# Patient Record
Sex: FEMALE | Race: OTHER | HISPANIC OR LATINO
[De-identification: names, ages, dates, MRNs, and addresses within clinical notes are randomized per-mention and may not be internally consistent; named-entity substitution may affect disease eponyms.]

---

## 2022-02-04 PROBLEM — Z00.00 ENCOUNTER FOR PREVENTIVE HEALTH EXAMINATION: Status: ACTIVE | Noted: 2022-02-04

## 2022-02-10 ENCOUNTER — ASOB RESULT (OUTPATIENT)
Age: 32
End: 2022-02-10

## 2022-02-10 ENCOUNTER — APPOINTMENT (OUTPATIENT)
Dept: ANTEPARTUM | Facility: CLINIC | Age: 32
End: 2022-02-10
Payer: MEDICAID

## 2022-02-10 PROCEDURE — 99072 ADDL SUPL MATRL&STAF TM PHE: CPT

## 2022-02-10 PROCEDURE — 76805 OB US >/= 14 WKS SNGL FETUS: CPT

## 2022-03-02 ENCOUNTER — ASOB RESULT (OUTPATIENT)
Age: 32
End: 2022-03-02

## 2022-03-02 ENCOUNTER — APPOINTMENT (OUTPATIENT)
Dept: ANTEPARTUM | Facility: CLINIC | Age: 32
End: 2022-03-02
Payer: MEDICAID

## 2022-03-02 PROCEDURE — 76816 OB US FOLLOW-UP PER FETUS: CPT

## 2022-05-04 ENCOUNTER — APPOINTMENT (OUTPATIENT)
Dept: ANTEPARTUM | Facility: CLINIC | Age: 32
End: 2022-05-04
Payer: MEDICAID

## 2022-05-04 ENCOUNTER — ASOB RESULT (OUTPATIENT)
Age: 32
End: 2022-05-04

## 2022-05-04 PROCEDURE — 76816 OB US FOLLOW-UP PER FETUS: CPT

## 2022-05-04 PROCEDURE — 76819 FETAL BIOPHYS PROFIL W/O NST: CPT

## 2022-05-04 PROCEDURE — 76820 UMBILICAL ARTERY ECHO: CPT

## 2022-05-11 ENCOUNTER — APPOINTMENT (OUTPATIENT)
Dept: ANTEPARTUM | Facility: CLINIC | Age: 32
End: 2022-05-11
Payer: MEDICAID

## 2022-05-11 ENCOUNTER — ASOB RESULT (OUTPATIENT)
Age: 32
End: 2022-05-11

## 2022-05-11 PROCEDURE — 93976 VASCULAR STUDY: CPT

## 2022-05-11 PROCEDURE — 76818 FETAL BIOPHYS PROFILE W/NST: CPT

## 2022-05-11 PROCEDURE — ZZZZZ: CPT

## 2022-05-11 PROCEDURE — 76821 MIDDLE CEREBRAL ARTERY ECHO: CPT

## 2022-05-11 PROCEDURE — 76820 UMBILICAL ARTERY ECHO: CPT

## 2022-05-18 ENCOUNTER — APPOINTMENT (OUTPATIENT)
Dept: ANTEPARTUM | Facility: CLINIC | Age: 32
End: 2022-05-18

## 2022-05-25 ENCOUNTER — ASOB RESULT (OUTPATIENT)
Age: 32
End: 2022-05-25

## 2022-05-25 ENCOUNTER — APPOINTMENT (OUTPATIENT)
Dept: ANTEPARTUM | Facility: CLINIC | Age: 32
End: 2022-05-25
Payer: MEDICAID

## 2022-05-25 PROCEDURE — 76816 OB US FOLLOW-UP PER FETUS: CPT

## 2022-05-25 PROCEDURE — 76819 FETAL BIOPHYS PROFIL W/O NST: CPT

## 2022-06-01 ENCOUNTER — APPOINTMENT (OUTPATIENT)
Dept: ANTEPARTUM | Facility: CLINIC | Age: 32
End: 2022-06-01

## 2022-06-08 ENCOUNTER — APPOINTMENT (OUTPATIENT)
Dept: ANTEPARTUM | Facility: CLINIC | Age: 32
End: 2022-06-08

## 2022-06-15 ENCOUNTER — APPOINTMENT (OUTPATIENT)
Dept: MATERNAL FETAL MEDICINE | Facility: CLINIC | Age: 32
End: 2022-06-15
Payer: MEDICAID

## 2022-06-15 ENCOUNTER — APPOINTMENT (OUTPATIENT)
Dept: ANTEPARTUM | Facility: CLINIC | Age: 32
End: 2022-06-15

## 2022-06-15 ENCOUNTER — APPOINTMENT (OUTPATIENT)
Dept: ANTEPARTUM | Facility: CLINIC | Age: 32
End: 2022-06-15
Payer: MEDICAID

## 2022-06-15 ENCOUNTER — ASOB RESULT (OUTPATIENT)
Age: 32
End: 2022-06-15

## 2022-06-15 ENCOUNTER — TRANSCRIPTION ENCOUNTER (OUTPATIENT)
Age: 32
End: 2022-06-15

## 2022-06-15 VITALS
HEIGHT: 62 IN | BODY MASS INDEX: 31.83 KG/M2 | RESPIRATION RATE: 16 BRPM | SYSTOLIC BLOOD PRESSURE: 116 MMHG | HEART RATE: 66 BPM | DIASTOLIC BLOOD PRESSURE: 76 MMHG | WEIGHT: 173 LBS | OXYGEN SATURATION: 98 %

## 2022-06-15 DIAGNOSIS — O36.5930 MATERNAL CARE FOR OTHER KNOWN OR SUSPECTED POOR FETAL GROWTH, THIRD TRIMESTER, NOT APPLICABLE OR UNSPECIFIED: ICD-10-CM

## 2022-06-15 DIAGNOSIS — Z78.9 OTHER SPECIFIED HEALTH STATUS: ICD-10-CM

## 2022-06-15 DIAGNOSIS — O99.213 OBESITY COMPLICATING PREGNANCY, THIRD TRIMESTER: ICD-10-CM

## 2022-06-15 DIAGNOSIS — Z3A.37 37 WEEKS GESTATION OF PREGNANCY: ICD-10-CM

## 2022-06-15 PROCEDURE — 93976 VASCULAR STUDY: CPT

## 2022-06-15 PROCEDURE — 76816 OB US FOLLOW-UP PER FETUS: CPT

## 2022-06-15 PROCEDURE — 76819 FETAL BIOPHYS PROFIL W/O NST: CPT

## 2022-06-15 PROCEDURE — 99205 OFFICE O/P NEW HI 60 MIN: CPT | Mod: TH,GC

## 2022-06-15 PROCEDURE — 76820 UMBILICAL ARTERY ECHO: CPT

## 2022-06-15 RX ORDER — PRENATAL VIT NO.126/IRON/FOLIC 28MG-0.8MG
28-0.8 TABLET ORAL
Refills: 0 | Status: ACTIVE | COMMUNITY

## 2022-06-15 NOTE — VITALS
[US Date: ___] : ultrasound performed on [unfilled]. [GA= ___ Weeks] : Results were GA of [unfilled] weeks [GA= ___ Days] : and [unfilled] day(s) [VIOLET by US (date): ___] : The calculated VIOLET by US is [unfilled] [By US] : this is the final VIOLET

## 2022-06-15 NOTE — ACTIVE PROBLEMS
[Diabetes Mellitus] : no diabetes mellitus [Heart Disease] : no heart disease [Hypertension] : no hypertension [Autoimmune Disease] : no autoimmune disease [Renal Disease] : no kidney disease, no UTI [Neurologic Disorder] : no neurologic disorder, no epilepsy [Psychiatric Disorders] : no psychiatric disorders [Depression] : no depression, no post partum depression [Hepatic Disorder] : no hepatitis, no liver disease [Thrombophlebitis] : no varicosities, no phlebitis [Thyroid Disorder] : no thyroid dysfunction [Trauma] : no trauma/violence [Blood Transfusion (___ Ml)] : no history of blood transfusion

## 2022-06-15 NOTE — FAMILY HISTORY
[Age 35+ During Pregnancy] : not 35 or over during pregnancy [Reported Family History Of Birth Defects] : no congenital heart defects [Dominik-Sachs Carrier] : no Dominik-Sachs [Family History] : no mental retardation/autism [Reported Family History Of Genetic Disease] : no history of child defect in child of baby father

## 2022-06-16 ENCOUNTER — TRANSCRIPTION ENCOUNTER (OUTPATIENT)
Age: 32
End: 2022-06-16

## 2022-06-16 ENCOUNTER — INPATIENT (INPATIENT)
Facility: HOSPITAL | Age: 32
LOS: 1 days | Discharge: HOME | End: 2022-06-18
Attending: OBSTETRICS & GYNECOLOGY | Admitting: OBSTETRICS & GYNECOLOGY
Payer: COMMERCIAL

## 2022-06-16 ENCOUNTER — RESULT REVIEW (OUTPATIENT)
Age: 32
End: 2022-06-16

## 2022-06-16 VITALS — DIASTOLIC BLOOD PRESSURE: 75 MMHG | SYSTOLIC BLOOD PRESSURE: 123 MMHG | HEART RATE: 80 BPM

## 2022-06-16 DIAGNOSIS — O36.5930 MATERNAL CARE FOR OTHER KNOWN OR SUSPECTED POOR FETAL GROWTH, THIRD TRIMESTER, NOT APPLICABLE OR UNSPECIFIED: ICD-10-CM

## 2022-06-16 DIAGNOSIS — O36.5990 MATERNAL CARE FOR OTHER KNOWN OR SUSPECTED POOR FETAL GROWTH, UNSPECIFIED TRIMESTER, NOT APPLICABLE OR UNSPECIFIED: ICD-10-CM

## 2022-06-16 DIAGNOSIS — O26.893 OTHER SPECIFIED PREGNANCY RELATED CONDITIONS, THIRD TRIMESTER: ICD-10-CM

## 2022-06-16 LAB
BASOPHILS # BLD AUTO: 0.07 K/UL — SIGNIFICANT CHANGE UP (ref 0–0.2)
BASOPHILS NFR BLD AUTO: 0.6 % — SIGNIFICANT CHANGE UP (ref 0–2)
BLD GP AB SCN SERPL QL: SIGNIFICANT CHANGE UP
EOSINOPHIL # BLD AUTO: 0.13 K/UL — SIGNIFICANT CHANGE UP (ref 0–0.5)
EOSINOPHIL NFR BLD AUTO: 1.2 % — SIGNIFICANT CHANGE UP (ref 0–6)
HCT VFR BLD CALC: 37.1 % — SIGNIFICANT CHANGE UP (ref 34.5–45)
HGB BLD-MCNC: 12.1 G/DL — SIGNIFICANT CHANGE UP (ref 11.5–15.5)
IMM GRANULOCYTES NFR BLD AUTO: 0.9 % — SIGNIFICANT CHANGE UP (ref 0–1.5)
LYMPHOCYTES # BLD AUTO: 1.41 K/UL — SIGNIFICANT CHANGE UP (ref 1–3.3)
LYMPHOCYTES # BLD AUTO: 13.1 % — SIGNIFICANT CHANGE UP (ref 13–44)
MCHC RBC-ENTMCNC: 27.3 PG — SIGNIFICANT CHANGE UP (ref 27–34)
MCHC RBC-ENTMCNC: 32.6 GM/DL — SIGNIFICANT CHANGE UP (ref 32–36)
MCV RBC AUTO: 83.6 FL — SIGNIFICANT CHANGE UP (ref 80–100)
MONOCYTES # BLD AUTO: 0.77 K/UL — SIGNIFICANT CHANGE UP (ref 0–0.9)
MONOCYTES NFR BLD AUTO: 7.1 % — SIGNIFICANT CHANGE UP (ref 2–14)
NEUTROPHILS # BLD AUTO: 8.3 K/UL — HIGH (ref 1.8–7.4)
NEUTROPHILS NFR BLD AUTO: 77.1 % — HIGH (ref 43–77)
PLATELET # BLD AUTO: 285 K/UL — SIGNIFICANT CHANGE UP (ref 150–400)
RBC # BLD: 4.44 M/UL — SIGNIFICANT CHANGE UP (ref 3.8–5.2)
RBC # FLD: 13.5 % — SIGNIFICANT CHANGE UP (ref 10.3–14.5)
SARS-COV-2 RNA SPEC QL NAA+PROBE: SIGNIFICANT CHANGE UP
WBC # BLD: 10.78 K/UL — HIGH (ref 3.8–10.5)
WBC # FLD AUTO: 10.78 K/UL — HIGH (ref 3.8–10.5)

## 2022-06-16 PROCEDURE — 88307 TISSUE EXAM BY PATHOLOGIST: CPT | Mod: 26

## 2022-06-16 PROCEDURE — 59514 CESAREAN DELIVERY ONLY: CPT | Mod: U9,GC

## 2022-06-16 PROCEDURE — 59412 ANTEPARTUM MANIPULATION: CPT | Mod: GC

## 2022-06-16 RX ORDER — ENOXAPARIN SODIUM 100 MG/ML
40 INJECTION SUBCUTANEOUS EVERY 24 HOURS
Refills: 0 | Status: DISCONTINUED | OUTPATIENT
Start: 2022-06-17 | End: 2022-06-18

## 2022-06-16 RX ORDER — DIPHENHYDRAMINE HCL 50 MG
25 CAPSULE ORAL EVERY 4 HOURS
Refills: 0 | Status: DISCONTINUED | OUTPATIENT
Start: 2022-06-16 | End: 2022-06-18

## 2022-06-16 RX ORDER — KETOROLAC TROMETHAMINE 30 MG/ML
30 SYRINGE (ML) INJECTION EVERY 6 HOURS
Refills: 0 | Status: DISCONTINUED | OUTPATIENT
Start: 2022-06-16 | End: 2022-06-17

## 2022-06-16 RX ORDER — ONDANSETRON 8 MG/1
4 TABLET, FILM COATED ORAL EVERY 6 HOURS
Refills: 0 | Status: DISCONTINUED | OUTPATIENT
Start: 2022-06-16 | End: 2022-06-18

## 2022-06-16 RX ORDER — OXYCODONE HYDROCHLORIDE 5 MG/1
5 TABLET ORAL ONCE
Refills: 0 | Status: DISCONTINUED | OUTPATIENT
Start: 2022-06-16 | End: 2022-06-18

## 2022-06-16 RX ORDER — OXYTOCIN 10 UNIT/ML
333.33 VIAL (ML) INJECTION
Qty: 20 | Refills: 0 | Status: DISCONTINUED | OUTPATIENT
Start: 2022-06-16 | End: 2022-06-18

## 2022-06-16 RX ORDER — ACETAMINOPHEN 500 MG
975 TABLET ORAL
Refills: 0 | Status: DISCONTINUED | OUTPATIENT
Start: 2022-06-16 | End: 2022-06-18

## 2022-06-16 RX ORDER — SODIUM CHLORIDE 9 MG/ML
1000 INJECTION, SOLUTION INTRAVENOUS ONCE
Refills: 0 | Status: COMPLETED | OUTPATIENT
Start: 2022-06-16 | End: 2022-06-16

## 2022-06-16 RX ORDER — NALBUPHINE HYDROCHLORIDE 10 MG/ML
2.5 INJECTION, SOLUTION INTRAMUSCULAR; INTRAVENOUS; SUBCUTANEOUS EVERY 6 HOURS
Refills: 0 | Status: DISCONTINUED | OUTPATIENT
Start: 2022-06-16 | End: 2022-06-18

## 2022-06-16 RX ORDER — DIPHENHYDRAMINE HCL 50 MG
25 CAPSULE ORAL EVERY 6 HOURS
Refills: 0 | Status: DISCONTINUED | OUTPATIENT
Start: 2022-06-16 | End: 2022-06-18

## 2022-06-16 RX ORDER — ACETAMINOPHEN 500 MG
1000 TABLET ORAL ONCE
Refills: 0 | Status: DISCONTINUED | OUTPATIENT
Start: 2022-06-16 | End: 2022-06-18

## 2022-06-16 RX ORDER — FAMOTIDINE 10 MG/ML
20 INJECTION INTRAVENOUS ONCE
Refills: 0 | Status: COMPLETED | OUTPATIENT
Start: 2022-06-16 | End: 2022-06-16

## 2022-06-16 RX ORDER — SODIUM CHLORIDE 9 MG/ML
1000 INJECTION, SOLUTION INTRAVENOUS
Refills: 0 | Status: DISCONTINUED | OUTPATIENT
Start: 2022-06-16 | End: 2022-06-16

## 2022-06-16 RX ORDER — SIMETHICONE 80 MG/1
80 TABLET, CHEWABLE ORAL EVERY 4 HOURS
Refills: 0 | Status: DISCONTINUED | OUTPATIENT
Start: 2022-06-16 | End: 2022-06-18

## 2022-06-16 RX ORDER — OXYCODONE HYDROCHLORIDE 5 MG/1
5 TABLET ORAL
Refills: 0 | Status: DISCONTINUED | OUTPATIENT
Start: 2022-06-16 | End: 2022-06-18

## 2022-06-16 RX ORDER — LANOLIN
1 OINTMENT (GRAM) TOPICAL EVERY 6 HOURS
Refills: 0 | Status: DISCONTINUED | OUTPATIENT
Start: 2022-06-16 | End: 2022-06-18

## 2022-06-16 RX ORDER — KETOROLAC TROMETHAMINE 30 MG/ML
30 SYRINGE (ML) INJECTION EVERY 6 HOURS
Refills: 0 | Status: DISCONTINUED | OUTPATIENT
Start: 2022-06-16 | End: 2022-06-18

## 2022-06-16 RX ORDER — INFLUENZA VIRUS VACCINE 15; 15; 15; 15 UG/.5ML; UG/.5ML; UG/.5ML; UG/.5ML
0.5 SUSPENSION INTRAMUSCULAR ONCE
Refills: 0 | Status: DISCONTINUED | OUTPATIENT
Start: 2022-06-16 | End: 2022-06-16

## 2022-06-16 RX ORDER — TETANUS TOXOID, REDUCED DIPHTHERIA TOXOID AND ACELLULAR PERTUSSIS VACCINE, ADSORBED 5; 2.5; 8; 8; 2.5 [IU]/.5ML; [IU]/.5ML; UG/.5ML; UG/.5ML; UG/.5ML
0.5 SUSPENSION INTRAMUSCULAR ONCE
Refills: 0 | Status: DISCONTINUED | OUTPATIENT
Start: 2022-06-16 | End: 2022-06-18

## 2022-06-16 RX ORDER — SODIUM CHLORIDE 9 MG/ML
1000 INJECTION, SOLUTION INTRAVENOUS
Refills: 0 | Status: DISCONTINUED | OUTPATIENT
Start: 2022-06-16 | End: 2022-06-18

## 2022-06-16 RX ORDER — MAGNESIUM HYDROXIDE 400 MG/1
30 TABLET, CHEWABLE ORAL
Refills: 0 | Status: DISCONTINUED | OUTPATIENT
Start: 2022-06-16 | End: 2022-06-18

## 2022-06-16 RX ORDER — CEFAZOLIN SODIUM 1 G
2000 VIAL (EA) INJECTION ONCE
Refills: 0 | Status: COMPLETED | OUTPATIENT
Start: 2022-06-16 | End: 2022-06-16

## 2022-06-16 RX ORDER — CITRIC ACID/SODIUM CITRATE 300-500 MG
30 SOLUTION, ORAL ORAL ONCE
Refills: 0 | Status: COMPLETED | OUTPATIENT
Start: 2022-06-16 | End: 2022-06-16

## 2022-06-16 RX ORDER — IBUPROFEN 200 MG
600 TABLET ORAL EVERY 6 HOURS
Refills: 0 | Status: COMPLETED | OUTPATIENT
Start: 2022-06-16 | End: 2023-05-15

## 2022-06-16 RX ORDER — NALOXONE HYDROCHLORIDE 4 MG/.1ML
0.1 SPRAY NASAL
Refills: 0 | Status: DISCONTINUED | OUTPATIENT
Start: 2022-06-16 | End: 2022-06-18

## 2022-06-16 RX ADMIN — FAMOTIDINE 20 MILLIGRAM(S): 10 INJECTION INTRAVENOUS at 12:04

## 2022-06-16 RX ADMIN — SODIUM CHLORIDE 125 MILLILITER(S): 9 INJECTION, SOLUTION INTRAVENOUS at 10:15

## 2022-06-16 RX ADMIN — Medication 30 MILLIGRAM(S): at 20:49

## 2022-06-16 RX ADMIN — Medication 100 MILLIGRAM(S): at 17:57

## 2022-06-16 RX ADMIN — Medication 30 MILLIGRAM(S): at 20:19

## 2022-06-16 RX ADMIN — Medication 30 MILLILITER(S): at 12:04

## 2022-06-16 RX ADMIN — SODIUM CHLORIDE 1000 MILLILITER(S): 9 INJECTION, SOLUTION INTRAVENOUS at 12:04

## 2022-06-16 RX ADMIN — Medication 0.25 MILLIGRAM(S): at 11:11

## 2022-06-16 RX ADMIN — SODIUM CHLORIDE 125 MILLILITER(S): 9 INJECTION, SOLUTION INTRAVENOUS at 14:36

## 2022-06-16 NOTE — OB RN PATIENT PROFILE - FALL HARM RISK - UNIVERSAL INTERVENTIONS
Bed in lowest position, wheels locked, appropriate side rails in place/Call bell, personal items and telephone in reach/Instruct patient to call for assistance before getting out of bed or chair/Non-slip footwear when patient is out of bed/[place]olly to call system/Physically safe environment - no spills, clutter or unnecessary equipment/Purposeful Proactive Rounding/Room/bathroom lighting operational, light cord in reach

## 2022-06-16 NOTE — OB PROVIDER H&P - ASSESSMENT
32 yr old  at 38w5d with 2 previous VD, breech presentation, FGR 6th percentile for an external cephalic version

## 2022-06-16 NOTE — OB RN DELIVERY SUMMARY - NSSELHIDDEN_OBGYN_ALL_OB_FT
[NS_DeliveryAttending1_OBGYN_ALL_OB_FT:[id number]=],[NS_DeliveryRN_OBGYN_ALL_OB_FT:[id number]=] [NS_DeliveryAttending1_OBGYN_ALL_OB_FT:[id number]=],[NS_DeliveryRN_OBGYN_ALL_OB_FT:[id number]=],[NS_DeliveryAssist1_OBGYN_ALL_OB_FT:[id number]=]

## 2022-06-16 NOTE — OB PROVIDER H&P - ATTENDING COMMENTS
MFM - 32 y.o.  at 38w5d with 2 previous vaginal deliveries, breech presentation, FGR 6th percentile for an external cephalic version  The patient presents to [facility] for external cephalic version (ECV) secondary to fetal malpresentation.  There is a viable brown in breech presentation.  BPP is 8/8 with increased amniotic fluid volume.             We reviewed the option of external cephalic version for breech presentation. We discussed the benefits (vaginal delivery) and risks including abruption, rupture of membranes, cord prolapse, and non-reassuring fetal heart rate patterns leading to emergency  section. Serious adverse maternal and fetal outcomes are reported but rare (<1%).  We also reviewed the procedure in detail and its success rate (50% to 60%), which is dependent on individual variables including nulliparity, placental location, low normal amniotic fluid, mike breech presentation, and persistent breech presentation. The patient was given the opportunity to ask questions and time to discuss her decision with her partner.  After counseling, the patient opted to proceed with ECV.  Informed consent was obtained and the informed consent document was signed. MFM - 32 y.o.  at 38w5d with 2 previous vaginal deliveries, breech presentation, FGR 6th percentile who presents for external cephalic version.  Ultrasound performed at bedside confirms a viable brown in breech presentation.  BPP is 8/8 with low normal amniotic fluid volume.   There was no nuchal cord or nuchal arms identified.  Patient had [facility] consultation prior to admission.  We again reviewed the option of external cephalic version for breech presentation. We discussed the benefits (vaginal delivery) and risks including abruption, rupture of membranes, cord prolapse, and non-reassuring fetal heart rate patterns leading to emergency  section. Serious adverse maternal and fetal outcomes are reported but rare (<1%).  We also reviewed the procedure in detail and its success rate (50% to 60%), which is dependent on individual variables including nulliparity, placental location, low normal amniotic fluid, mike breech presentation, and persistent breech presentation. The patient was given the opportunity to ask questions and time to discuss her family.  After counseling, the patient opted to proceed with ECV.  Informed consent was obtained and the informed consent document was signed.  Plan for EVC and if successful, patient to stay for IOL given FGR.  If not successful, plan for primary CD.  L&D staff and anesthesia aware.   Cherry Christopher MD  Maternal Fetal Medicine

## 2022-06-16 NOTE — OB PROVIDER H&P - HISTORY OF PRESENT ILLNESS
Ms. Braun is a 32 yr old  at 38w5d with breech presents for an external cephalic version. Her pregnancy is also complicated by FGR 6th percentile, normal umbilical artery Doppler. She has no obstertrical complaints. She denies any abdominal pain, leakage of fluid, or vaginal bleeding. She reports good fetal movement.

## 2022-06-16 NOTE — OB PROVIDER H&P - PROBLEM SELECTOR PLAN 3
FHRT reassuring  Plan for delivery today given gestational age with route of delivery dependent on if version is successful

## 2022-06-16 NOTE — OB PROVIDER H&P - PROBLEM SELECTOR PLAN 1
Admitted for ECV: R/B/A discussed with patient including byut not limited to emergency CD, NRFHT, fetal intolerance of procedure, placental abruption, and ROM  Terbutaline  Continuous monitoring

## 2022-06-16 NOTE — OB PROVIDER DELIVERY SUMMARY - NSSELHIDDEN_OBGYN_ALL_OB_FT
[NS_DeliveryAttending1_OBGYN_ALL_OB_FT:[id number]=],[NS_DeliveryRN_OBGYN_ALL_OB_FT:[id number]=],[NS_DeliveryAssist1_OBGYN_ALL_OB_FT:[id number]=]

## 2022-06-16 NOTE — OB PROVIDER H&P - NS_FHRACCEL_OBGYN_ALL_OB
Medication:   Requested Prescriptions     Pending Prescriptions Disp Refills    FLUoxetine (PROZAC) 40 MG capsule [Pharmacy Med Name: FLUOXETINE HCL 40MG CAPS] 30 capsule 5     Sig: TAKE 1 CAPSULE BY MOUTH ONE TIME A DAY        Last Filled:  12/17/19 #30, 11 RF     Patient Phone Number: [phone] (home)     Last appt: 11/10/2020 ADHD   Next appt: Visit date not found Present (15 x15 bpm)

## 2022-06-16 NOTE — PROCEDURE NOTE - NSICDXPROBLEMMLMBOX_GEN
IPSTART~^~1~^~[id number]~^~~^~IPEND  PKSTART~^~[id number]~^~PKEND  IPSTART~^~2~^~[id number]~^~~^~IPEND  PKSTART~^~[id number]~^~PKEND

## 2022-06-16 NOTE — PROCEDURE NOTE - ADDITIONAL PROCEDURE DETAILS
An external cephalic version performed under ultrasound guidance. The fetal heartbeat was checked through out the procedure. The procedure was unsuccessful; unable to vert the fetus. The patient tolerated the procedure well. An external cephalic version performed under ultrasound guidance. The fetal heartbeat was checked through out the procedure. The procedure was unsuccessful; unable to vert the fetus. The patient tolerated the procedure well.  FHR remained reassuring throughout the procedure.      MFM attending addendum:  After a thorough explanation of the risks and benefits, an external cephalic version (ECV) was attempted under continuous ultrasound guidance.  Terbutaline was given prior to procedure. The fetal buttocks were elevated from the pelvis and gentle attempt at ECV was performed.  The ECV was not successful.  Fetal heart rate was documented post procedure and continuous fetal heart rate monitoring was performed  pending  delivery which will be performed today secondary to FGR.  The patient is Rh positive and Rhogam was not indicated.  OB staff and anesthesia aware.   Cherry Christopher MD  Maternal Fetal Medicine

## 2022-06-16 NOTE — OB RN DELIVERY SUMMARY - NS_SEPSISRSKCALC_OBGYN_ALL_OB_FT
EOS calculated successfully. EOS Risk Factor: 0.5/1000 live births ([facility] national incidence); GA=38w6d; Temp=98.8; ROM=0; GBS='Negative'; Antibiotics='No antibiotics or any antibiotics < 2 hrs prior to birth'

## 2022-06-16 NOTE — CHART NOTE - NSCHARTNOTEFT_GEN_A_CORE
PROCEDURE NOTE     Patient was extensively counseled on the R/B/A to ECV. Consent was signed. Patient with reactive NST for >20 minutes. Bedside sonogram confirmed breech presentation. Patient placed in supine position. Terbutaline given. Attempted ECV performed.  Bedside ultrasound used to confirm normal FH multiple times throughout procedure. Failed ECV. Reactive NST after attempt. Decision made to move forward with pCS in the setting of breech presentation and FGR.     Dr. Christopher and Dr. Arguelles present. PROCEDURE NOTE     Patient was extensively counseled on the R/B/A to ECV. Consent was signed. Patient with reactive NST for >20 minutes. Bedside sonogram confirmed breech presentation. Patient placed in supine position. Terbutaline given. ECV performed.  Bedside ultrasound used to confirm normal FH multiple times throughout procedure.  ECV was not successful. Reactive NST after attempt.  Patient scheduled for primary CD today secondary to gestational age, breech presentation, and FGR.     Dr. Christopher and Dr. Arguelles present.

## 2022-06-16 NOTE — OB PROVIDER DELIVERY SUMMARY - NSPROVIDERDELIVERYNOTE_OBGYN_ALL_OB_FT
Brief  Delivery Summary    Procedure: pLTCS for breech presentation, FGR, failed ECV   Findings: Viable male infant, apgars 7/8, weight 2610g, breech presentation. Grossly normal uterus, fallopian tubes and ovaries.   Single layer uterine closure  SubQ skin closure  EBL: 416cc  UOP: 100cc  Fluids in OR: 1500cc  Complications: Uterine inversion

## 2022-06-16 NOTE — OB RN INTRAOPERATIVE NOTE - NSSELHIDDEN_OBGYN_ALL_OB_FT
[NS_DeliveryAttending1_OBGYN_ALL_OB_FT:[id number]=],[NS_DeliveryRN_OBGYN_ALL_OB_FT:[id number]] [NS_DeliveryAttending1_OBGYN_ALL_OB_FT:[id number]=],[NS_DeliveryRN_OBGYN_ALL_OB_FT:[id number]=],[NS_DeliveryAssist1_OBGYN_ALL_OB_FT:[id number]=]

## 2022-06-17 LAB
BASOPHILS # BLD AUTO: 0.06 K/UL — SIGNIFICANT CHANGE UP (ref 0–0.2)
BASOPHILS NFR BLD AUTO: 0.5 % — SIGNIFICANT CHANGE UP (ref 0–2)
EOSINOPHIL # BLD AUTO: 0.04 K/UL — SIGNIFICANT CHANGE UP (ref 0–0.5)
EOSINOPHIL NFR BLD AUTO: 0.4 % — SIGNIFICANT CHANGE UP (ref 0–6)
HCT VFR BLD CALC: 32 % — LOW (ref 34.5–45)
HGB BLD-MCNC: 10.4 G/DL — LOW (ref 11.5–15.5)
IMM GRANULOCYTES NFR BLD AUTO: 0.7 % — SIGNIFICANT CHANGE UP (ref 0–1.5)
LYMPHOCYTES # BLD AUTO: 1.56 K/UL — SIGNIFICANT CHANGE UP (ref 1–3.3)
LYMPHOCYTES # BLD AUTO: 13.7 % — SIGNIFICANT CHANGE UP (ref 13–44)
MCHC RBC-ENTMCNC: 27.8 PG — SIGNIFICANT CHANGE UP (ref 27–34)
MCHC RBC-ENTMCNC: 32.5 GM/DL — SIGNIFICANT CHANGE UP (ref 32–36)
MCV RBC AUTO: 85.6 FL — SIGNIFICANT CHANGE UP (ref 80–100)
MONOCYTES # BLD AUTO: 0.83 K/UL — SIGNIFICANT CHANGE UP (ref 0–0.9)
MONOCYTES NFR BLD AUTO: 7.3 % — SIGNIFICANT CHANGE UP (ref 2–14)
NEUTROPHILS # BLD AUTO: 8.82 K/UL — HIGH (ref 1.8–7.4)
NEUTROPHILS NFR BLD AUTO: 77.4 % — HIGH (ref 43–77)
PLATELET # BLD AUTO: 243 K/UL — SIGNIFICANT CHANGE UP (ref 150–400)
RBC # BLD: 3.74 M/UL — LOW (ref 3.8–5.2)
RBC # FLD: 13.6 % — SIGNIFICANT CHANGE UP (ref 10.3–14.5)
T PALLIDUM AB TITR SER: NEGATIVE — SIGNIFICANT CHANGE UP
WBC # BLD: 11.39 K/UL — HIGH (ref 3.8–10.5)
WBC # FLD AUTO: 11.39 K/UL — HIGH (ref 3.8–10.5)

## 2022-06-17 RX ORDER — IBUPROFEN 200 MG
1 TABLET ORAL
Qty: 20 | Refills: 0
Start: 2022-06-17 | End: 2022-06-21

## 2022-06-17 RX ORDER — IBUPROFEN 200 MG
600 TABLET ORAL EVERY 6 HOURS
Refills: 0 | Status: DISCONTINUED | OUTPATIENT
Start: 2022-06-17 | End: 2022-06-18

## 2022-06-17 RX ORDER — ACETAMINOPHEN 500 MG
3 TABLET ORAL
Qty: 60 | Refills: 0
Start: 2022-06-17 | End: 2022-06-21

## 2022-06-17 RX ADMIN — Medication 975 MILLIGRAM(S): at 02:51

## 2022-06-17 RX ADMIN — Medication 975 MILLIGRAM(S): at 15:29

## 2022-06-17 RX ADMIN — Medication 30 MILLIGRAM(S): at 11:43

## 2022-06-17 RX ADMIN — Medication 600 MILLIGRAM(S): at 23:06

## 2022-06-17 RX ADMIN — Medication 600 MILLIGRAM(S): at 18:05

## 2022-06-17 RX ADMIN — ONDANSETRON 4 MILLIGRAM(S): 8 TABLET, FILM COATED ORAL at 00:15

## 2022-06-17 RX ADMIN — Medication 1 TABLET(S): at 18:05

## 2022-06-17 RX ADMIN — ENOXAPARIN SODIUM 40 MILLIGRAM(S): 100 INJECTION SUBCUTANEOUS at 15:29

## 2022-06-17 RX ADMIN — Medication 30 MILLIGRAM(S): at 05:07

## 2022-06-17 RX ADMIN — Medication 975 MILLIGRAM(S): at 20:39

## 2022-06-17 RX ADMIN — Medication 30 MILLIGRAM(S): at 00:15

## 2022-06-17 RX ADMIN — Medication 975 MILLIGRAM(S): at 09:46

## 2022-06-17 NOTE — OB NEONATOLOGY/PEDIATRICIAN DELIVERY SUMMARY - NSPEDSNEONOTESA_OBGYN_ALL_OB_FT
Dr. Conner requested me to attend P  C/S at 38.6 weeks due to breech presentation. The mother is 33 y/o, , O+, R-NI, GBS, RPR, HIV, HBsAg are NR.  L & D: Bautista breech, passed meconium, suctioned , dried, required CPAP X 30 sec for poor color and tone, APGAR 7 & 8   AsstL full term appropriate for gestational age, BB, P C/S , Breech presentation  Plan: observe in transition, if stable admit to NBN, need B/L Hips US at 44wks PMA

## 2022-06-17 NOTE — DISCHARGE NOTE OB - NS MD DC FALL RISK RISK
For information on Fall & Injury Prevention, visit: [email or web address] OR  [email or web address] OR  https://www.cdc.gov/steadi/patient.html

## 2022-06-17 NOTE — DISCHARGE NOTE OB - HOSPITAL COURSE
Patient underwent a  section due to breech after a failed external cephalic version. Delivery was complicated by a uterine inversion. Post-partum course was uncomplicated. Pain is well controlled with PRN medication. She has no difficulty with ambulation, voiding, or PO intake. Lab values and vital signs are stable prior to discharge.

## 2022-06-17 NOTE — DISCHARGE NOTE OB - MEDICATION SUMMARY - MEDICATIONS TO TAKE
I will START or STAY ON the medications listed below when I get home from the hospital:    acetaminophen 325 mg oral tablet  -- 3 tab(s) by mouth every 6 hours   -- Indication: For pp pain    ibuprofen 600 mg oral tablet  -- 1 tab(s) by mouth every 6 hours  -- Indication: For pp pain    Prenatal Multivitamins with Folic Acid 1 mg oral tablet  -- 1 tab(s) by mouth once a day  -- Indication: For pp

## 2022-06-17 NOTE — DISCHARGE NOTE OB - CARE PROVIDER_API CALL
Chantelle Mendez)  Obstetrics and Gynecology  [address] [place] [address]  Phone: [phone]  Fax: [phone]  Follow Up Time:

## 2022-06-17 NOTE — DISCHARGE NOTE OB - PATIENT PORTAL LINK FT
You can access the FollowMyHealth Patient Portal offered by [facility] by registering at the following website: [email or web address]. By joining [employer]’s FollowMyHealth portal, you will also be able to view your health information using other applications (apps) compatible with our system.

## 2022-06-18 VITALS
RESPIRATION RATE: 18 BRPM | DIASTOLIC BLOOD PRESSURE: 71 MMHG | HEART RATE: 69 BPM | SYSTOLIC BLOOD PRESSURE: 106 MMHG | TEMPERATURE: 98 F | OXYGEN SATURATION: 99 %

## 2022-06-18 PROCEDURE — 86780 TREPONEMA PALLIDUM: CPT

## 2022-06-18 PROCEDURE — U0003: CPT

## 2022-06-18 PROCEDURE — G0463: CPT

## 2022-06-18 PROCEDURE — 85025 COMPLETE CBC W/AUTO DIFF WBC: CPT

## 2022-06-18 PROCEDURE — 86850 RBC ANTIBODY SCREEN: CPT

## 2022-06-18 PROCEDURE — 59050 FETAL MONITOR W/REPORT: CPT

## 2022-06-18 PROCEDURE — 88307 TISSUE EXAM BY PATHOLOGIST: CPT

## 2022-06-18 PROCEDURE — 59025 FETAL NON-STRESS TEST: CPT

## 2022-06-18 PROCEDURE — 86900 BLOOD TYPING SEROLOGIC ABO: CPT

## 2022-06-18 PROCEDURE — 90707 MMR VACCINE SC: CPT

## 2022-06-18 PROCEDURE — 36415 COLL VENOUS BLD VENIPUNCTURE: CPT

## 2022-06-18 PROCEDURE — 86901 BLOOD TYPING SEROLOGIC RH(D): CPT

## 2022-06-18 PROCEDURE — 59412 ANTEPARTUM MANIPULATION: CPT

## 2022-06-18 PROCEDURE — U0005: CPT

## 2022-06-18 RX ADMIN — SIMETHICONE 80 MILLIGRAM(S): 80 TABLET, CHEWABLE ORAL at 11:50

## 2022-06-18 RX ADMIN — Medication 975 MILLIGRAM(S): at 02:17

## 2022-06-18 RX ADMIN — Medication 600 MILLIGRAM(S): at 11:50

## 2022-06-18 RX ADMIN — Medication 600 MILLIGRAM(S): at 05:03

## 2022-06-18 RX ADMIN — Medication 0.5 MILLILITER(S): at 11:51

## 2022-06-18 RX ADMIN — ENOXAPARIN SODIUM 40 MILLIGRAM(S): 100 INJECTION SUBCUTANEOUS at 09:20

## 2022-06-18 RX ADMIN — Medication 975 MILLIGRAM(S): at 09:20

## 2022-06-18 NOTE — PROGRESS NOTE ADULT - SUBJECTIVE AND OBJECTIVE BOX
BRUNA BARRAZA is a 32y  now POD#1 s/p primary  section @38w6d GA 2/2 breech/FGR, c/b uterine inversion.    S:    No acute events overnight.   Pain controlled with current treatment regimen.   She was nauseous and vomiting overnight but was able to tolerate PO fluids w meds after receiving zofran.  Pt endorses mild lightheadedness/dizziness w ambulation. Denies palpitations, CP, SOB.   - flatus/-BM/bocanegra in place  She endorses appropriate lochia, which is decreasing.   She is breastfeeding and providing formula to baby.  She denies fevers, chills.    O:    T(C): 36.7 (22 @ 04:54), Max: 37.1 (22 @ 10:09)  HR: 87 (22 @ 04:54) (63 - 90)  BP: 112/73 (22 @ 04:54) (103/69 - 123/81)  RR: 18 (22 @ 04:54) (14 - 21)  SpO2: 96% (22 @ 04:54) (96% - 100%)    Gen: NAD, AOx3  Resp: breathing comfortably on RA  Abdomen:  Soft, appropriately tender  Incision: Clean/dry/intact with steris in place. pressure dressing removed   Uterus:  Fundus firm below umbilicus  VE:  Lochia as expected                          12.1   10.78 )-----------( 285      ( 2022 10:28 )             37.1             
 Delivery Progress Note    BRUNA BARRAZA is a 32y  who is post-op day #2 who delivered a male infant at 38w6d by primary  delivery for breech presentation and FGR %ile.     SUBJECTIVE:  No acute events overnight. Pain is well controlled with PRN pain medication. No problems with ambulating, voiding, or PO intake. Has had flatus but no BM. Denies nausea and vomiting. Patient is having appropriate lochia which is decreasing.      OBJECTIVE:  Physical exam:  Vital Signs Last 24 Hrs  T(C): 36.6 (2022 05:12), Max: 37 (2022 08:32)  T(F): 97.8 (2022 05:12), Max: 98.6 (2022 08:32)  HR: 69 (2022 05:12) (69 - 99)  BP: 106/71 (2022 05:12) (101/67 - 106/71)  RR: 18 (2022 05:12) (18 - 18)  SpO2: 99% (2022 05:12) (96% - 99%)  General: alert and oriented x3, no acute distress.  Heart: regular rate and rhythm, no murmurs, rubs or gallops appreciated.  Lungs: clear to auscultation bilaterally.   Abdomen: Soft, appropriately tender to palpitation, firm uterine fundus at umbilicus. Incision appears dry and intact.  Extremities: no tenderness, redness or swelling in lower extremities bilaterally.     Labs:                         10.4   11.39 )-----------( 243      ( 2022 07:17 )             32.0

## 2022-06-18 NOTE — PROGRESS NOTE ADULT - ASSESSMENT
A/P:  BRUNA BARRAZA is a 32y  now POD#1 s/p primary  section @38w6d GA 2/2 breech/FGR, c/b uterine inversion.  -Vital signs stable  -Hgb: 12.1 -> AM labs pending   -pt dizzy w initial ambulation, will f/u AM CBC, encourage hydration. continue to monitor   -bocanegra in place. TOV pending removal  -Advance diet as tolerated   -VTE ppx: encourage ambulation, SCDs while in bed, daily lovenox  -Continue routine postpartum and postoperative care and education  -Healthy male infant, declines circumcision  -Dispo: Patient to be discharged when meeting all postpartum and postoperative milestones and pending attending approval.
ASSESSMENT  BRUNA BARRAZA is a 32y  who is post-op day #2 who delivered a male infant at 38w6d by primary  delivery for breech presentation and FGR %ile. No acute events.     PLAN  1. Routine post-op care  - Continue to encourage ambulation, PO intake and breastfeeding  - DVT prophylaxis: lovenox, SCDs  - Continue pain management PRN  - Plan to discharge per attending approval

## 2022-06-18 NOTE — PROGRESS NOTE ADULT - ATTENDING COMMENTS
32y  now POD#1   no complaints  tolerating PO  voiding well  denies nausea and vomiting   denies SOB, dizziness  abd soft, uterus firm, incision intact , steristrip in place  ext no edema  Hb 10.4  plan   routine Post op care  DVT ppx
post  day # 2  no complaints  exam wnl  labs reviewed    cleared for dc  discussed contraception, vaccination, breastfeeding  follow up for wound check and post partum visit

## 2022-06-21 NOTE — HISTORY OF PRESENT ILLNESS
[FreeTextEntry1] : Marguerite Braun is a 32 year old  with LMP of 21 and EDC of 22 who presents at 38w4d for  consultation to discuss external cephalic version for persistent breech presentation. Her BMI is 31.6 kg/m2.  Patient has no complaints. She denies any abdominal pain, leakage of fluid, or vaginal bleeding. She reports good fetal movement. She was found to have a growth restricted fetus at the time of prior ultrasound, which was confirmed today with EFW measuring 2688 gm (6th percentile) with normal umbilical artery Doppler studies.

## 2022-06-21 NOTE — PHYSICAL EXAM
[FreeTextEntry1] : Well appearing gravid female in no distress.  Abdomen soft, gravid, and nontender.  FHR noted on ultrasound.

## 2022-06-21 NOTE — DATA REVIEWED
[FreeTextEntry1] : TAUS 6/15/22 - breech, posterior placenta, EFW 2688 gm (6%), fetal AC <1%, RE 8.97 cm, BPP 10/10, normal UA Doppler studies

## 2022-06-21 NOTE — OB HISTORY
[VIOLET: ___] : VIOLET: [unfilled] [Sonogram] : sonogram [at ___ wks] : at [unfilled] weeks [Definite:  ___ (Date)] : the last menstrual period was [unfilled] [Normal Amount/Duration] : was of a normal amount and duration [Regular Cycle Intervals] : periods have been regular [Frequency: Q ___ days] : menstrual periods occur approximately every [unfilled] days [Menarche Age: ____] : age at menarche was [unfilled] [Pregnancy History] : patient did not receive anesthesia [___] : no pregnancy complications reported [FreeTextEntry1] : Fetal growth restriction noted in the third trimester [Spotting Between  Menses] : no spotting between menses [Menstrual Cramps] : no menstrual cramps [On BCP at conception] : the patient was not on BCP at conception

## 2022-06-21 NOTE — LETTER CLOSING
[If you have any questions, please do not hesitate to contact our office.] : If you have any questions, please do not hesitate to contact our office. [Sincerely,] : Sincerely, [Thank you very much for allowing me to participate in the care of this patient.] : Thank you very much for allowing me to participate in the care of this patient

## 2022-06-21 NOTE — CHIEF COMPLAINT
[G ___] : G [unfilled] [P ___] : P [unfilled] [de-identified] : fetal growth restriction and malpresentation.  Patient desires external cephalic version (ECV)

## 2022-06-21 NOTE — DISCUSSION/SUMMARY
[FreeTextEntry1] : We had the pleasure of seeing Marguerite Hicks for Maternal-Fetal Medicine consultation today. She is a 32 year old  at 38w4d of gestation (VIOLET of 2022 based on ultrasound).\par \par Fetal Growth Restriction: We reviewed the results of today's ultrasound findings which report and estimated fetal weight measuring at  <10th percentile.  This represents fetal growth restriction. It was discussed that we are unable to determine, based on this finding alone, whether the fetus is constitutionally or pathologically small. Stillbirth,  death,  morbidity, and abnormal neurodevelopmental outcome are more common in growth-restricted fetuses than in those with typical growth. Delivery timing will be based on several factors, including gestational age, Doppler of the umbilical artery, biophysical profile score, and the presence or absence of risk factors for, or signs of, uteroplacental insufficiency. Given her current gestational age, EFW, and normal UA Doppler studies, delivery may be considered any time after 38 weeks. \par \par Breech presentation: We reviewed the option of external cephalic version for breech presentation. We discussed the benefits (vaginal delivery) and risks including abruption, rupture of membranes, cord prolapse, and non-reassuring fetal heart rate patterns leading to emergency  section. Serious adverse maternal and fetal outcomes are reported but rare (<1%).  We also reviewed the procedure in detail and its success rate (50% to 60%), which is dependent on individual variables including nulliparity, placental location, low normal amniotic fluid, mike breech presentation, and persistent breech presentation. The patient was given the opportunity to ask questions and time to discuss her decision with her family by telephone.   After counseling, the patient opted to proceed with ECV. This has been scheduled at [facility] on 22.  If successful, she will be admitted for induction of labor.  If unsuccessful, she will be admitted for primary  section in the setting of fetal growth restriction. \par \par Thank you for requesting a maternal-fetal medicine consultation on this patient. The patient was seen by Dr. Arguelles and Dr. Christopher.

## 2022-06-22 ENCOUNTER — APPOINTMENT (OUTPATIENT)
Dept: ANTEPARTUM | Facility: CLINIC | Age: 32
End: 2022-06-22

## 2022-06-28 LAB — SURGICAL PATHOLOGY STUDY: SIGNIFICANT CHANGE UP

## 2023-05-15 NOTE — PROCEDURE NOTE - NSICDXPROCEDURE_GEN_ALL_CORE_FT
PROCEDURES:  External cephalic version with tocolysis 17-Jun-2022 00:25:12  Bladimir Arguelles   n/a n/a

## 2023-06-04 NOTE — OB RN PATIENT PROFILE - NSICDXPASTMEDICALHX_GEN_ALL_CORE_FT
reported hx of syncope, unwilling to provide more information due to not feeling well, reports bladder/bowel incontinence and LOC though no confusion  last syncope episode reported 4 months prior   ECG- QTC ok  CTM
PAST MEDICAL HISTORY:   (normal spontaneous vaginal delivery) x2 (, )